# Patient Record
(demographics unavailable — no encounter records)

---

## 2024-11-15 NOTE — CONSULT LETTER
[Dear  ___] : Dear  [unfilled], [Consult Letter:] : I had the pleasure of evaluating your patient, [unfilled]. [Please see my note below.] : Please see my note below. [Consult Closing:] : Thank you very much for allowing me to participate in the care of this patient.  If you have any questions, please do not hesitate to contact me. [Sincerely,] : Sincerely, [FreeTextEntry3] : Dr. Solorio

## 2024-11-15 NOTE — ASSESSMENT
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  She is having recurrent right sided and generalized abdominal pain similar to her gallbladder pain, struggles with constipation and has bloating.  I have asked her to take a low-dose famotidine for her reflux.  I recommended a gas reducing supplement and suggested she do daily exercise.  Given recent normal blood work would prefer a conservative evaluation but future options would include a repeat MRCP (normal in 2022) or CAT scan.  I have emphasized good nutrition, fiber supplementation and laxative use to achieve daily defecation.  Return to office in 3 months.  If the patient gets and "attack" then I have ordered blood work for her to do during an episode.

## 2024-11-15 NOTE — REVIEW OF SYSTEMS
[As Noted in HPI] : as noted in HPI [Joint Stiffness] : joint stiffness [Skin Wound] : no skin wound [Negative] : Heme/Lymph [FreeTextEntry6] : stable CHF [FreeTextEntry8] : chronic UTI

## 2024-11-15 NOTE — HISTORY OF PRESENT ILLNESS
[FreeTextEntry1] : MR MRCP [facility] IC  PROCEDURE DATE: 11/05/2022    INTERPRETATION: CLINICAL INFORMATION: 84-year-old female admitted with vertigo and vision loss attributed to TIA. Abdominal pain, nausea, and vomiting. Biliary dilatation and questionable periampullary collections on CT scan.  COMPARISON: CT abdomen pelvis 11/4/2022.  CONTRAST/COMPLICATIONS: IV Contrast: Gadavist 10cc cc administered 0cc cc discarded Oral Contrast: None. Complications: None reported.  PROCEDURE: MRI of the abdomen was performed. MRCP was performed.  FINDINGS: LOWER CHEST: Mild cardiomegaly.  LIVER: Within normal limits. BILE DUCTS:Pneumobilia, related to prior history of ERCP. Intrahepatic biliary ducts and CBD dilatation with distal tapering. GALLBLADDER: Cholecystectomy. SPLEEN: Within normal limits. PANCREAS: Within normal limits. ADRENALS: Within normal limits. KIDNEYS/URETERS: Left renal cyst.  VISUALIZED PORTIONS: BOWEL: Within normal limits. Periampullary duodenal diverticulum PERITONEUM: No ascites. VESSELS: Within normal limits. RETROPERITONEUM/LYMPH NODES: No lymphadenopathy. ABDOMINAL WALL: Within normal limits. BONES: Degenerative changes.  IMPRESSION: Pneumobilia, with given prior history of ERCP. Intrahepatic biliary ducts and CBD dilatation with distal tapering.

## 2024-11-15 NOTE — PHYSICAL EXAM
[Alert] : alert [Normal Voice/Communication] : normal voice/communication [Healthy Appearing] : healthy appearing [No Acute Distress] : no acute distress [Sclera] : the sclera and conjunctiva were normal [Hearing Threshold Finger Rub Not [place]] : hearing was normal [Normal Lips/Gums] : the lips and gums were normal [Oropharynx] : the oropharynx was normal [Normal Appearance] : the appearance of the neck was normal [No Neck Mass] : no neck mass was observed [No Respiratory Distress] : no respiratory distress [No Acc Muscle Use] : no accessory muscle use [Respiration, Rhythm And Depth] : normal respiratory rhythm and effort [Auscultation Breath Sounds / Voice Sounds] : lungs were clear to auscultation bilaterally [Heart Rate And Rhythm] : heart rate was normal and rhythm regular [Normal S1, S2] : normal S1 and S2 [Murmurs] : no murmurs [Bowel Sounds] : normal bowel sounds [Abdomen Tenderness] : non-tender [No Masses] : no abdominal mass palpated [Abdomen Soft] : soft [] : no hepatosplenomegaly [Oriented To Time, Place, And Person] : oriented to person, place, and time [de-identified] : overweight [de-identified] : walks with cane